# Patient Record
Sex: FEMALE | Race: WHITE | NOT HISPANIC OR LATINO | Employment: OTHER | URBAN - METROPOLITAN AREA
[De-identification: names, ages, dates, MRNs, and addresses within clinical notes are randomized per-mention and may not be internally consistent; named-entity substitution may affect disease eponyms.]

---

## 2024-07-10 ENCOUNTER — OFFICE VISIT (OUTPATIENT)
Dept: NEUROLOGY | Facility: CLINIC | Age: 89
End: 2024-07-10
Payer: MEDICARE

## 2024-07-10 VITALS
HEART RATE: 64 BPM | TEMPERATURE: 98.3 F | DIASTOLIC BLOOD PRESSURE: 73 MMHG | BODY MASS INDEX: 27.55 KG/M2 | RESPIRATION RATE: 16 BRPM | WEIGHT: 131.8 LBS | SYSTOLIC BLOOD PRESSURE: 109 MMHG

## 2024-07-10 DIAGNOSIS — F02.80 LATE ONSET ALZHEIMER'S DISEASE WITHOUT BEHAVIORAL DISTURBANCE (MULTI): Primary | ICD-10-CM

## 2024-07-10 DIAGNOSIS — G30.1 LATE ONSET ALZHEIMER'S DISEASE WITHOUT BEHAVIORAL DISTURBANCE (MULTI): Primary | ICD-10-CM

## 2024-07-10 PROCEDURE — 1125F AMNT PAIN NOTED PAIN PRSNT: CPT | Performed by: PSYCHIATRY & NEUROLOGY

## 2024-07-10 PROCEDURE — 99214 OFFICE O/P EST MOD 30 MIN: CPT | Performed by: PSYCHIATRY & NEUROLOGY

## 2024-07-10 RX ORDER — HYDROXYCHLOROQUINE SULFATE 200 MG/1
TABLET, FILM COATED ORAL
COMMUNITY
Start: 2023-09-26

## 2024-07-10 RX ORDER — ATORVASTATIN CALCIUM 40 MG/1
40 TABLET, FILM COATED ORAL NIGHTLY
COMMUNITY

## 2024-07-10 RX ORDER — CARVEDILOL 12.5 MG/1
12.5 TABLET ORAL 2 TIMES DAILY
COMMUNITY

## 2024-07-10 RX ORDER — CHOLECALCIFEROL (VITAMIN D3) 50 MCG
2000 TABLET ORAL
COMMUNITY

## 2024-07-10 RX ORDER — NABUMETONE 500 MG/1
500 TABLET, FILM COATED ORAL
COMMUNITY
Start: 2023-09-28

## 2024-07-10 RX ORDER — FOLIC ACID 0.8 MG
1 TABLET ORAL DAILY
COMMUNITY

## 2024-07-10 RX ORDER — MEMANTINE HYDROCHLORIDE 10 MG/1
10 TABLET ORAL 2 TIMES DAILY
COMMUNITY
Start: 2024-04-29 | End: 2024-07-10 | Stop reason: SDUPTHER

## 2024-07-10 RX ORDER — ASPIRIN 81 MG/1
81 TABLET ORAL
COMMUNITY

## 2024-07-10 RX ORDER — LEVOTHYROXINE SODIUM 25 UG/1
1 TABLET ORAL
COMMUNITY
Start: 2023-03-11

## 2024-07-10 RX ORDER — MEMANTINE HYDROCHLORIDE 10 MG/1
10 TABLET ORAL 2 TIMES DAILY
Qty: 180 TABLET | Refills: 3 | Status: SHIPPED | OUTPATIENT
Start: 2024-07-10 | End: 2025-07-10

## 2024-07-10 RX ORDER — CARVEDILOL 6.25 MG/1
6.25 TABLET ORAL 2 TIMES DAILY
COMMUNITY

## 2024-07-10 ASSESSMENT — PATIENT HEALTH QUESTIONNAIRE - PHQ9
SUM OF ALL RESPONSES TO PHQ9 QUESTIONS 1 AND 2: 0
2. FEELING DOWN, DEPRESSED OR HOPELESS: NOT AT ALL
1. LITTLE INTEREST OR PLEASURE IN DOING THINGS: NOT AT ALL

## 2024-07-10 ASSESSMENT — ENCOUNTER SYMPTOMS
OCCASIONAL FEELINGS OF UNSTEADINESS: 1
LOSS OF SENSATION IN FEET: 0

## 2024-07-10 ASSESSMENT — PAIN SCALES - GENERAL: PAINLEVEL: 8

## 2024-07-10 NOTE — PATIENT INSTRUCTIONS
- continue current medications  - follow-up with me in about one year    General brain health guidelines:  - make sure your other medical conditions are well controlled (e.g., high blood pressure, high cholesterol, diabetes, sleep apnea etc)  - do not smoke or chew tobacco  - address any sensory deficits (e.g., proper glasses for poor eyesight, hearing aids for hearing loss)  - use a weekly pill box  - eat a heart healthy diet (e.g., lots of fruits and vegetables, low fat and cholesterol)  - exercise at least four days per week, 30 minutes at a time at an intensity that would make it difficult to converse with someone   - make sure you are getting at least 7 hours of quality sleep per night  - keep yourself mentally active daily by reading, playing cards, doing word searches, puzzles, etc.  - stay socially active by being part of a group or organization    Please note that the above may not be an entirely accurate or complete list of your medications, allergies, past medical history, past surgical history, or active problems as the document is completed following your visit.

## 2024-07-10 NOTE — PROGRESS NOTES
Start Time:2:00pm  Stop Time:2:30pm    Chart reviewed, including physician notes and diagnostic studies, for 3 minutes prior to this appointment.     Accompanied by:     Formulation: Ms. Chavez is a very-pleasant 89 y.o. year old,  ,  female with a past personal history of probable Alzheimer's disease and hearing loss who is presenting today for a follow-up visit.  She continues to decline from a cognitive and functional standpoint as expected for her diagnosis.      Diagnosis:  Probable Alzheimer's disease, moderate severity  Hearing loss      Plan:  - continue current medications  - follow-up with me in about one year  -----------------------------------------------------------------------------------    History of Present Illness:  I last saw the patient in September 2023, at which time we recommended that she get hearing aids and the research team was going to contact her.  Living in the past more.  More forgetful.   has to do most things and she is not left alone.  Mood is good.  She is sleeping and eating well.  No psychosis.  No irritability or agitation.  No passive death wish or suicidal ideation.  Her walking has slowed but she has had no substantial falls.    No Known Allergies      Current Outpatient Medications:     hydroxychloroquine (Plaquenil) 200 mg tablet, TAKE 1 TABLET DAILY AFTER BREAKFAST, Disp: , Rfl:     levothyroxine (Synthroid, Levoxyl) 25 mcg tablet, Take 1 tablet (25 mcg) by mouth once daily in the morning. Take before meals., Disp: , Rfl:     nabumetone (Relafen) 500 mg tablet, Take 1 tablet (500 mg) by mouth once daily., Disp: , Rfl:     aspirin 81 mg EC tablet, Take 1 tablet (81 mg) by mouth once daily., Disp: , Rfl:     atorvastatin (Lipitor) 40 mg tablet, Take 1 tablet (40 mg) by mouth once daily at bedtime., Disp: , Rfl:     carvedilol (Coreg) 12.5 mg tablet, Take 1 tablet (12.5 mg) by mouth 2 times a day., Disp: , Rfl:     carvedilol (Coreg) 6.25 mg  "tablet, Take 1 tablet (6.25 mg) by mouth 2 times a day., Disp: , Rfl:     cholecalciferol (Vitamin D-3) 50 MCG (2000 UT) tablet, Take 1 tablet (2,000 Units) by mouth once daily., Disp: , Rfl:     folic acid (Folvite) 800 mcg tablet, Take 1 mg by mouth once daily., Disp: , Rfl:     memantine (Namenda) 10 mg tablet, Take 1 tablet (10 mg) by mouth 2 times a day., Disp: 180 tablet, Rfl: 3    Review of Systems  As per HPI, otherwise all other systems have been reviewed are negative for complaint.      Objective   /73   Pulse 64   Temp 36.8 °C (98.3 °F) (Tympanic)   Resp 16   Wt 59.8 kg (131 lb 12.8 oz)   BMI 27.55 kg/m²     EXAMINATION  Mental State Examination  General: A+O to person and partially to time and place, dressed casually, well groomed, good eye contact  Motor: no PMR/PMA, antalgic and unsteady gait with the use of a walker  Speech: non-spontaneous  Mood: \"good\"  Affect: stable, full range with brightening, and mood congruent  Denies passive death wish and suicidal ideation  Thought process: repetitive without LOAs  Thought content: denies hallucinations, delusions, and not RTIS  Insight/Judgment: poor/poor     MoCA ( 09/13/2023): 7/30     MMSE ( 4/7/21): 8/30  [facility]: (8/28/19) 12/30   Mini-Mental State Exam (6/2/16): 19/30 (-4 orientation, -4 attention, -3 recall)  MMSE (01/06/2015): 20/30.   MMSE May 2014: 20/30  MMSE in 2005: (25-26)/30   "

## 2024-07-16 ENCOUNTER — TELEPHONE (OUTPATIENT)
Dept: NEUROLOGY | Facility: CLINIC | Age: 89
End: 2024-07-16
Payer: MEDICARE

## 2024-07-23 ENCOUNTER — TELEPHONE (OUTPATIENT)
Dept: GERIATRIC MEDICINE | Facility: CLINIC | Age: 89
End: 2024-07-23
Payer: MEDICARE

## 2024-12-09 ENCOUNTER — TELEPHONE (OUTPATIENT)
Dept: GERIATRIC MEDICINE | Facility: CLINIC | Age: 89
End: 2024-12-09
Payer: MEDICARE

## 2025-02-14 ENCOUNTER — TELEPHONE (OUTPATIENT)
Dept: BEHAVIORAL HEALTH | Facility: CLINIC | Age: OVER 89
End: 2025-02-14
Payer: MEDICARE

## 2025-02-14 NOTE — TELEPHONE ENCOUNTER
Returned phone call.    Main issues is the patient is now living in the past due to her memory problems.  She doesn't get agitated about it, but sometimes she can get stuck on something and it can be frustrating for the caregivers.  We talked about how this is unfortunately normal for the condition and as long as it is not resulting in dangerous behaviors, that we should just try to redirect.  He expressed agreement and understanding.

## 2025-07-16 ENCOUNTER — APPOINTMENT (OUTPATIENT)
Dept: NEUROLOGY | Facility: CLINIC | Age: OVER 89
End: 2025-07-16
Payer: MEDICARE